# Patient Record
Sex: FEMALE | Race: WHITE
[De-identification: names, ages, dates, MRNs, and addresses within clinical notes are randomized per-mention and may not be internally consistent; named-entity substitution may affect disease eponyms.]

---

## 2017-10-29 ENCOUNTER — HOSPITAL ENCOUNTER (EMERGENCY)
Dept: HOSPITAL 4 - SED | Age: 52
Discharge: HOME | End: 2017-10-29
Payer: COMMERCIAL

## 2017-10-29 VITALS — SYSTOLIC BLOOD PRESSURE: 146 MMHG

## 2017-10-29 VITALS — BODY MASS INDEX: 26.58 KG/M2 | HEIGHT: 63 IN | WEIGHT: 150 LBS

## 2017-10-29 VITALS — SYSTOLIC BLOOD PRESSURE: 112 MMHG

## 2017-10-29 DIAGNOSIS — J32.0: Primary | ICD-10-CM

## 2017-10-29 DIAGNOSIS — R03.0: ICD-10-CM

## 2017-10-29 PROCEDURE — 87081 CULTURE SCREEN ONLY: CPT

## 2017-10-29 PROCEDURE — 86403 PARTICLE AGGLUT ANTBDY SCRN: CPT

## 2017-10-29 PROCEDURE — 99284 EMERGENCY DEPT VISIT MOD MDM: CPT

## 2017-10-29 PROCEDURE — 96372 THER/PROPH/DIAG INJ SC/IM: CPT

## 2017-10-29 PROCEDURE — 36415 COLL VENOUS BLD VENIPUNCTURE: CPT

## 2018-05-08 ENCOUNTER — HOSPITAL ENCOUNTER (EMERGENCY)
Dept: HOSPITAL 4 - SED | Age: 53
Discharge: HOME | End: 2018-05-08
Payer: COMMERCIAL

## 2018-05-08 VITALS — SYSTOLIC BLOOD PRESSURE: 143 MMHG | BODY MASS INDEX: 25.69 KG/M2 | HEIGHT: 63 IN | WEIGHT: 145 LBS

## 2018-05-08 VITALS — SYSTOLIC BLOOD PRESSURE: 138 MMHG

## 2018-05-08 DIAGNOSIS — M77.9: ICD-10-CM
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
## 2018-05-08 DIAGNOSIS — S13.4XXA: Primary | ICD-10-CM

## 2018-07-09 ENCOUNTER — HOSPITAL ENCOUNTER (EMERGENCY)
Dept: HOSPITAL 4 - SED | Age: 53
Discharge: HOME | End: 2018-07-09
Payer: COMMERCIAL

## 2018-07-09 VITALS — WEIGHT: 150 LBS | BODY MASS INDEX: 26.58 KG/M2 | HEIGHT: 63 IN

## 2018-07-09 VITALS — SYSTOLIC BLOOD PRESSURE: 119 MMHG

## 2018-07-09 DIAGNOSIS — Z00.01: Primary | ICD-10-CM

## 2018-07-09 DIAGNOSIS — R03.0: ICD-10-CM

## 2018-10-04 ENCOUNTER — HOSPITAL ENCOUNTER (EMERGENCY)
Dept: HOSPITAL 4 - SED | Age: 53
Discharge: HOME | End: 2018-10-04
Payer: MEDICAID

## 2018-10-04 VITALS — SYSTOLIC BLOOD PRESSURE: 141 MMHG | HEIGHT: 63 IN | BODY MASS INDEX: 26.58 KG/M2 | WEIGHT: 150 LBS

## 2018-10-04 VITALS — SYSTOLIC BLOOD PRESSURE: 135 MMHG
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
## 2018-10-04 DIAGNOSIS — S50.02XA: Primary | ICD-10-CM

## 2018-10-04 DIAGNOSIS — R03.0: ICD-10-CM
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
## 2018-10-04 NOTE — NUR
Patient given written and verbal discharge instructions and verbalizes 
understanding.  ER MD discussed with patient the results and treatment 
provided. Patient in stable condition. ID arm band removed. 

Rx of motrin given. Patient educated on pain management and to follow up with 
PMD. Pain Scale 2.

Opportunity for questions provided and answered.

## 2018-10-04 NOTE — NUR
-------------------------------------------------------------------------------

            *** Note undone in ED - 10/04/18 at 1025 by ROYCE ***            

-------------------------------------------------------------------------------

Pt placed to  bed 06, report given to HAILE Manzano.

## 2018-10-04 NOTE — NUR
Pt presents to ER s/p mechanical fall. Pt reports hitting L elbow and L great 
toe, denies head trauma, denies KO, denies chest pain or sob, denies nausea or 
vomiting. Pt ambulatory, able to move L 1st toe, able to move L arm w/o pain to 
L elbow. No deformities noted. Pt ambulatory, AOX4, no signs of acute distress, 
speaking full sentences.